# Patient Record
Sex: FEMALE
[De-identification: names, ages, dates, MRNs, and addresses within clinical notes are randomized per-mention and may not be internally consistent; named-entity substitution may affect disease eponyms.]

---

## 2020-07-02 ENCOUNTER — HOSPITAL ENCOUNTER (EMERGENCY)
Dept: HOSPITAL 88 - ER | Age: 50
Discharge: HOME | End: 2020-07-02
Payer: COMMERCIAL
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
## 2020-07-02 DIAGNOSIS — R07.89: Primary | ICD-10-CM

## 2020-07-02 PROCEDURE — 99282 EMERGENCY DEPT VISIT SF MDM: CPT

## 2020-07-02 PROCEDURE — 71046 X-RAY EXAM CHEST 2 VIEWS: CPT

## 2020-07-02 NOTE — EMERGENCY DEPARTMENT NOTE
History of Present Illnes


History of Present Illness


Chief Complaint:  General Medicine Complaints


History of Present Illness


This is a 49 year old  female  arrives the ED with sternal chest pain 

after mechanical fall a few days ago. Patient states pain is worse on palpation 

and motion. Patient denies any shortness of breath..


Historian:  Patient


Arrival Mode:  Car


Onset (how long ago):  day(s)


Severity:  mild


Onset quality:  sudden


Duration (how long):  day(s)


Timing of current episode:  constant


Progression:  unchanged


Chronicity:  new


Context:  Reports trauma/injury


Relieving factors:  none


Exacerbating factors:  none


Associated symptoms:  Reports denies other symptoms





Past Medical/Family History


Physician Review


I have reviewed the patient's past medical and family history.  Any updates have

been documented here.





Past Medical History


Recent Fever:  No


Clinical Suspicion of Infectio:  No


New/Unexplained Change in Ment:  No





Review of Systems


Review of Systems


Constitutional:  Reports no symptoms


EENTM:  Reports no symptoms


Cardiovascular:  Reports no symptoms, Reports as per HPI, Reports chest pain


Respiratory:  Reports no symptoms


Gastrointestinal:  Reports no symptoms


Genitourinary:  Reports no symptoms


Musculoskeletal:  Reports no symptoms


Integumentary:  Reports no symptoms


Neurological:  Reports no symptoms


Psychological:  Reports no symptoms


Endocrine:  Reports no symptoms


Hematological/Lymphatic:  Reports no symptoms





Physical Exam


Related Data


Allergies:  


Coded Allergies:  


     No Known Allergies (Unverified , 7/2/20)


Triage Vital Signs





Vital Signs








  Date Time  Temp Pulse Resp B/P (MAP) Pulse Ox O2 Delivery O2 Flow Rate FiO2


 


7/2/20 15:24 97.8 77 18 146/124 100 Room Air  








Vital signs reviewed:  Yes





Physical Exam


CONSTITUTIONAL





Constitutional:  Present well-developed, Present well-nourished


HENT


HENT:  Present normocephalic, Present atraumatic, Present oropharynx 

clear/moist, Present nose normal


HENT L/R:  Present left ext ear normal, Present right ext ear normal


EYES





Eyes:  Reports PERRL, Reports conjunctivae normal


NECK


Neck:  Present ROM normal


PULMONARY


Pulmonary:  Present effort normal, Present breath sounds normal


CARDIOVASCULAR





Cardiovascular:  Present regular rhythm, Present heart sounds normal, Present 

capillary refill normal, Present normal rate, Present other (tenderness over 

palpation of sternum, no bruising, no ecchymoses, no step-off, no crepitus)


GASTROINTESTINAL





Abdominal:  Present soft, Present nontender, Present bowel sounds normal


GENITOURINARY





Genitourinary:  Present exam deferred


SKIN


Skin:  Present warm, Present dry


MUSCULOSKELETAL





Musculoskeletal:  Present ROM normal


NEUROLOGICAL





Neurological:  Present alert, Present oriented x 3, Present no gross motor or s

ensory deficits


PSYCHOLOGICAL


Psychological:  Present mood/affect normal, Present judgement normal





Results


Imaging


Imaging results reviewed:  Yes


Imaging Comments


Normal





Assessment & Plan


Medical Decision Making


MDM


49-year-old female arrived to the ED with traumatic chest pain, normal chest x-

ray, patient with marked elevated hypertension. Patient with no history of high 

blood pressure. Patient did not wish to have lab work done in the emergency 

department. Patient's blood pressure improved with clonidine and discharged home

on hydrochlorothiazide, outpatient cardiology referral given., Aortic 

dissection, aneurysm and other life threatening cardiac/mediastinal etiologies 

considered at time of discharge.





Assessment & Plan


Final Impression:  


(1) Chest pain


Depart Disposition:  HOME, SELF-CARE


Last Vital Signs











  Date Time  Temp Pulse Resp B/P (MAP) Pulse Ox O2 Delivery O2 Flow Rate FiO2


 


7/2/20 16:02    246/124    


 


7/2/20 15:24 97.8 77 18  100 Room Air  








Medications in the ED





Ketorolac Tromethamine 60 mg ONCE  ONCE IM  Last administered on 7/2/20at 16:01;

Admin Dose 60 MG;  Start 7/2/20 at 15:30;  Stop 7/2/20 at 15:32;  Status DC


Clonidine HCl 0.1 mg ONCE  ONCE PO  Last administered on 7/2/20at 16:02; Admin 

Dose 0.1 MG;  Start 7/2/20 at 15:30;  Stop 7/2/20 at 15:32;  Status DC











NESS OHARA,              Jul 2, 2020 19:09

## 2020-07-02 NOTE — DIAGNOSTIC IMAGING REPORT
EXAMINATION:  CHEST 2 VIEWS    



INDICATION: Chest pain, trauma



COMPARISON: None

     

FINDINGS:



LINES/TUBES:None



LUNGS:The lungs are well-inflated. No focal consolidation or pulmonary edema.



PLEURA:No pleural effusion or pneumothorax.



MEDIASTINUM:The cardiomediastinal silhouette appears normal in size and shape.



BONES/SOFT TISSUES:No acute osseous injury.



ABDOMEN:No free air under the diaphragm.





IMPRESSION: 

No focal pneumonia or pulmonary edema.



No radiographic evidence of acute traumatic injury to the thorax.



Signed by: Bridger Wise MD on 7/2/2020 4:42 PM